# Patient Record
Sex: MALE
[De-identification: names, ages, dates, MRNs, and addresses within clinical notes are randomized per-mention and may not be internally consistent; named-entity substitution may affect disease eponyms.]

---

## 2021-03-18 ENCOUNTER — APPOINTMENT (OUTPATIENT)
Dept: DISASTER EMERGENCY | Facility: OTHER | Age: 21
End: 2021-03-18
Payer: COMMERCIAL

## 2021-03-18 PROCEDURE — 0011A: CPT

## 2021-04-15 ENCOUNTER — APPOINTMENT (OUTPATIENT)
Dept: DISASTER EMERGENCY | Facility: OTHER | Age: 21
End: 2021-04-15

## 2024-09-26 ENCOUNTER — EMERGENCY (EMERGENCY)
Facility: HOSPITAL | Age: 24
LOS: 0 days | Discharge: HOME | End: 2024-09-26
Attending: EMERGENCY MEDICINE
Payer: COMMERCIAL

## 2024-09-26 VITALS
TEMPERATURE: 98 F | OXYGEN SATURATION: 97 % | DIASTOLIC BLOOD PRESSURE: 84 MMHG | SYSTOLIC BLOOD PRESSURE: 153 MMHG | HEART RATE: 73 BPM | RESPIRATION RATE: 18 BRPM

## 2024-09-26 DIAGNOSIS — H61.22 IMPACTED CERUMEN, LEFT EAR: ICD-10-CM

## 2024-09-26 DIAGNOSIS — H92.02 OTALGIA, LEFT EAR: ICD-10-CM

## 2024-09-26 PROCEDURE — 99282 EMERGENCY DEPT VISIT SF MDM: CPT

## 2024-09-26 PROCEDURE — 99283 EMERGENCY DEPT VISIT LOW MDM: CPT

## 2024-09-26 NOTE — ED PROVIDER NOTE - PHYSICAL EXAMINATION
VSS, awake, alert, non-toxic appearing, PERRL / EOMI, external ears are normal, auditory meatus is clear and non-erythematous bilaterally, cerumen impaction left > right. The nose is normal in appearance, no rhinorrhea, septum and turbinates appear normal and there is no significant erythema, exudate or erosions. The lips, gums and teeth appear without trauma or obvious abnormality, mmm, no exudates or erosions on the buccal mucosa, uvula is mid-line, tonsils are not inflamed or edematous, posterior pharynx is free of erythema and exudates. alert, clear speech, steady gait.

## 2024-09-26 NOTE — ED PROVIDER NOTE - OBJECTIVE STATEMENT
24-year-old male denies significant PMH now presents with left ear pain which started today, denies fever, chills, headache, facial swelling, dizziness, nasal congestion, rhinorrhea, epistaxis, sore throat, dysphagia, odynophagia, hoarseness, or other associated complaints at present. No old chart available for review. I have reviewed and agree with the initial nursing note, except as documented in my note.

## 2024-09-26 NOTE — ED PROVIDER NOTE - PATIENT PORTAL LINK FT
You can access the FollowMyHealth Patient Portal offered by [facility] by registering at the following website: [email or web address]. By joining [employer]’s FollowMyHealth portal, you will also be able to view your health information using other applications (apps) compatible with our system.

## 2024-09-26 NOTE — ED PROVIDER NOTE - CLINICAL SUMMARY MEDICAL DECISION MAKING FREE TEXT BOX
Left ear pain, c/w impaction. Exam and history not consistent with AOM. I have a low suspicion at this time for mastoiditis, malignant otitis externa, herpes or elizabeth hunt syndrome, or retained foreign body. Rec outpt ENT. Return precautions discussed.

## 2024-09-26 NOTE — ED PROVIDER NOTE - NSFOLLOWUPINSTRUCTIONS_ED_ALL_ED_FT
USE DEBROX OR A FEW DROPS OF MINERAL OIL IN THE AFFECTED EAR BEFORE BEDTIME    Our Emergency Department Referral Coordinators will be reaching out to you in the next 24-48 hours from 9:00am to 5:00pm with a follow up ENT appointment. Please expect a phone call from the hospital in that time frame. If you do not receive a call or if you have any questions or concerns, you can reach them at [phone].    Earwax Buildup, Adult  Your ears make something called earwax. It helps keep germs called bacteria away and protects the skin in your ears. Sometimes, too much earwax can build up. This can cause discomfort or make it harder to hear.    What are the causes?  Earwax buildup can happen when you have too much earwax in your ears. Earwax is made in the outer part of your ear canal. It's supposed to fall out in small amounts over time.    But if your ears aren't able to clean themselves like they should, earwax can build up.    What increases the risk?  You're more likely to get earwax buildup if:  You clean your ears with cotton swabs.  You pick at your ears.  You use earplugs or in-ear headphones a lot.  You wear hearing aids.  You may also be more likely to get it if:  You're male.  You're older.  Your ears naturally make more earwax.  You have narrow ear canals or extra hair in your ears.  Your earwax is too thick or sticky.  You have eczema.  You're dehydrated. This means there's not enough fluid in your body.  What are the signs or symptoms?  Symptoms of earwax buildup include:  Not being able to hear as well.  A feeling of fullness in your ear.  Feeling like your ear is plugged.  Fluid coming from your ear.  Ear pain or an itchy ear.  Ringing in your ear.  Coughing or problems with balance.  How is this diagnosed?  Earwax buildup may be diagnosed based on your symptoms, medical history, and an ear exam. During the exam, your health care provider will look into your ear with a tool called an otoscope.    You may also have tests, such as a hearing test.    How is this treated?  Earwax buildup may be treated by:  Using ear drops.  Having the earwax removed by a provider. The provider may:  Flush the ear with water.  Use a tool called a curette that has a loop on the end.  Use a suction device.  Having surgery. This may be done in severe cases.  Follow these instructions at home:  A sign showing that a person should not use a cotton swab in the ear.  Cleaning your ears    Clean your ears as told by your provider. You can clean the outside of your ears with a washcloth or tissue.  Do not overclean your ears.  Do not put anything into your ear unless told. This includes cotton swabs.  General instructions    Take over-the-counter and prescription medicines only as told by your provider.  Drink enough fluid to keep your pee (urine) pale yellow. This helps thin the earwax.  If you have hearing aids, clean them as told.  Keep all follow-up visits. If earwax builds up in your ears often or if you use hearing aids, ask your provider how often you should have your ears cleaned.  Contact a health care provider if:  Your ear pain gets worse.  You have a fever.  You have pus, blood, or other fluid coming from your ear.  You have hearing loss.  You have ringing in your ears that won't go away.  You feel like the room is spinning. This is called vertigo.  Your symptoms don't get better with treatment.  This information is not intended to replace advice given to you by your health care provider. Make sure you discuss any questions you have with your health care provider.

## 2024-09-30 NOTE — CHART NOTE - NSCHARTNOTEFT_GEN_A_CORE
[facility] MRN [id number] sent to ENT 9/27 LW patient wanted next day appt- unable to accommodate 9/30 LW